# Patient Record
Sex: MALE | Race: BLACK OR AFRICAN AMERICAN | Employment: FULL TIME | URBAN - METROPOLITAN AREA
[De-identification: names, ages, dates, MRNs, and addresses within clinical notes are randomized per-mention and may not be internally consistent; named-entity substitution may affect disease eponyms.]

---

## 2022-05-25 ENCOUNTER — OFFICE VISIT (OUTPATIENT)
Dept: ORTHOPEDIC SURGERY | Age: 57
End: 2022-05-25
Payer: COMMERCIAL

## 2022-05-25 VITALS
OXYGEN SATURATION: 100 % | TEMPERATURE: 97.3 F | WEIGHT: 202.4 LBS | HEIGHT: 70 IN | HEART RATE: 70 BPM | BODY MASS INDEX: 28.98 KG/M2

## 2022-05-25 DIAGNOSIS — G89.29 CHRONIC RIGHT SHOULDER PAIN: Primary | ICD-10-CM

## 2022-05-25 DIAGNOSIS — M75.101 TEAR OF RIGHT ROTATOR CUFF, UNSPECIFIED TEAR EXTENT, UNSPECIFIED WHETHER TRAUMATIC: ICD-10-CM

## 2022-05-25 DIAGNOSIS — M25.511 CHRONIC RIGHT SHOULDER PAIN: Primary | ICD-10-CM

## 2022-05-25 PROCEDURE — 73030 X-RAY EXAM OF SHOULDER: CPT | Performed by: ORTHOPAEDIC SURGERY

## 2022-05-25 PROCEDURE — 99203 OFFICE O/P NEW LOW 30 MIN: CPT | Performed by: ORTHOPAEDIC SURGERY

## 2022-05-25 RX ORDER — MELOXICAM 15 MG/1
15 TABLET ORAL DAILY
Qty: 30 TABLET | Refills: 1 | Status: SHIPPED | OUTPATIENT
Start: 2022-05-25

## 2022-05-25 NOTE — PROGRESS NOTES
Scout Joy  1965   Chief Complaint   Patient presents with    Shoulder Pain     rt        HISTORY OF PRESENT ILLNESS  Scout Joy is a 64 y.o. male who presents today for evaluation of right shoulder. He rates his pain 0/10 today. Pain has been present for many years. He has pain anteriorly and with certain movements. Notes the pain has gradually gotten worse over the years. He has been exercising at home and noticed pain with abduction and external and internal rotation. He has night pain and can't lay on the right side. Has tried following treatments: Injections:NO; Brace:NO; Therapy:NO; Cane/Crutch:NO       No Known Allergies     Past Medical History:   Diagnosis Date    Elevated PSA     Nausea & vomiting       Social History     Socioeconomic History    Marital status:      Spouse name: Not on file    Number of children: Not on file    Years of education: Not on file    Highest education level: Not on file   Occupational History    Not on file   Tobacco Use    Smoking status: Never Smoker    Smokeless tobacco: Never Used   Substance and Sexual Activity    Alcohol use: No    Drug use: Never    Sexual activity: Not on file   Other Topics Concern    Not on file   Social History Narrative    Not on file     Social Determinants of Health     Financial Resource Strain:     Difficulty of Paying Living Expenses: Not on file   Food Insecurity:     Worried About Running Out of Food in the Last Year: Not on file    Kash of Food in the Last Year: Not on file   Transportation Needs:     Lack of Transportation (Medical): Not on file    Lack of Transportation (Non-Medical):  Not on file   Physical Activity:     Days of Exercise per Week: Not on file    Minutes of Exercise per Session: Not on file   Stress:     Feeling of Stress : Not on file   Social Connections:     Frequency of Communication with Friends and Family: Not on file    Frequency of Social Gatherings with Friends and Family: Not on file    Attends Jain Services: Not on file    Active Member of Clubs or Organizations: Not on file    Attends Club or Organization Meetings: Not on file    Marital Status: Not on file   Intimate Partner Violence:     Fear of Current or Ex-Partner: Not on file    Emotionally Abused: Not on file    Physically Abused: Not on file    Sexually Abused: Not on file   Housing Stability:     Unable to Pay for Housing in the Last Year: Not on file    Number of [place] in the Last Year: Not on file    Unstable Housing in the Last Year: Not on file      Past Surgical History:   Procedure Laterality Date    HX COLONOSCOPY      HX WISDOM TEETH EXTRACTION        History reviewed. No pertinent family history. Current Outpatient Medications   Medication Sig    triamcinolone acetonide (KENALOG) 0.1 % topical cream Apply  to affected area two (2) times a day.  tadalafiL (CIALIS) 20 mg tablet Take 1 Tablet by mouth as needed (prior to intercourse).  SELENIUM PO Take  by mouth daily.  naproxen sodium (ALEVE) 220 mg cap Take  by mouth daily as needed.  atorvastatin (LIPITOR) 10 mg tablet TAKE 1 TABLET BY MOUTH ONCE DAILY    multivitamin (ONE A DAY) tablet Take 1 Tab by mouth daily. No current facility-administered medications for this visit. REVIEW OF SYSTEM   Patient denies: Weight loss, Fever/Chills, HA, Visual changes, Fatigue, Chest pain, SOB, Abdominal pain, N/V/D/C, Blood in stool or urine, Edema. Pertinent positive as above in HPI. All others were negative    PHYSICAL EXAM:   Visit Vitals  Pulse 70   Temp 97.3 °F (36.3 °C) (Temporal)   Ht 5' 10\" (1.778 m)   Wt 202 lb 6.4 oz (91.8 kg)   SpO2 100%   BMI 29.04 kg/m²     The patient is a well-developed, well-nourished male   in no acute distress. The patient is alert and oriented times three. The patient is alert and oriented times three.  Mood and affect are normal.  LYMPHATIC: lymph nodes are not enlarged and are within normal limits  SKIN: normal in color and non tender to palpation. There are no bruises or abrasions noted. NEUROLOGICAL: Motor sensory exam is within normal limits. Reflexes are equal bilaterally. There is normal sensation to pinprick and light touch  MUSCULOSKELETAL:  Examination Right shoulder   Skin Intact   AC joint tenderness -   Biceps tenderness -   Forward flexion/Elevation    Active abduction    Glenohumeral abduction 90   External rotation ROM 45   Internal rotation ROM 45   Apprehension -   Forrests Relocation -   Jerk -   Load and Shift -   Obriens -   Speeds -   Impingement sign +   Supraspinatus/Empty Can +   External Rotation Strength -, 5/5   Lift Off/Belly Press -, 5/5   Neurovascular Intact        PROCEDURE: none    IMAGING: XR of the right shoulder with 3 views obtained in the office dated 5/25/2022 was reviewed and read by Dr. Milligan Pay: Sclerotic changes in the greater tuberosity with a type III acromion      IMPRESSION:      ICD-10-CM ICD-9-CM    1. Chronic right shoulder pain  M25.511 719.41 AMB POC XRAY, SHOULDER; COMPLETE, 2+    G89.29 338.29    2. Tear of right rotator cuff, unspecified tear extent, unspecified whether traumatic  M75.101 840.4         PLAN:  1. Patient presents today with right shoulder pain due to a possible RC tear and I would like to order a MRI to assess this. Start with mobic to help with inflammation. Offered cortisone injection today. Pt declined. Risk factors include: n/a  2. No ultrasound exam indicated today  3. No cortisone injection indicated today   4. No Physical/Occupational Therapy indicated today  5. Yes diagnostic test indicated today: R SHOULDER MRI   6. No durable medical equipment indicated today  7. No referral indicated today   8. Yes medications indicated today: MOBIC  9.  No Narcotic indicated today       RTC following MRI       Scribed by Torrey Patel as dictated by Amol Arora MD    I,  Nancy Hilliard, confirm that all documentation is accurate.     Nancy Hilliard M.D.   [facility]

## 2022-08-06 ENCOUNTER — HOSPITAL ENCOUNTER (OUTPATIENT)
Age: 57
Discharge: HOME | End: 2022-08-06
Attending: ORTHOPAEDIC SURGERY
Payer: COMMERCIAL

## 2022-08-06 DIAGNOSIS — M75.101 TEAR OF RIGHT ROTATOR CUFF, UNSPECIFIED TEAR EXTENT, UNSPECIFIED WHETHER TRAUMATIC: ICD-10-CM

## 2022-08-06 PROCEDURE — 73221 MRI JOINT UPR EXTREM W/O DYE: CPT

## 2022-08-18 ENCOUNTER — OFFICE VISIT (OUTPATIENT)
Dept: ORTHOPEDIC SURGERY | Age: 57
End: 2022-08-18
Payer: COMMERCIAL

## 2022-08-18 VITALS — HEIGHT: 70 IN | TEMPERATURE: 97.5 F | WEIGHT: 206 LBS | BODY MASS INDEX: 29.49 KG/M2

## 2022-08-18 DIAGNOSIS — M75.121 COMPLETE TEAR OF RIGHT ROTATOR CUFF, UNSPECIFIED WHETHER TRAUMATIC: Primary | ICD-10-CM

## 2022-08-18 PROCEDURE — 99213 OFFICE O/P EST LOW 20 MIN: CPT | Performed by: ORTHOPAEDIC SURGERY

## 2022-08-18 NOTE — PROGRESS NOTES
Charly Garsia  1965   Chief Complaint   Patient presents with    Results     MRI        HISTORY OF PRESENT ILLNESS  Charly Garsia is a 64 y.o. male who presents today for reevaluation of right shoulder and MRI review. Patient rates pain as 1/10 today. Pain has been present for many years. He has pain anteriorly and with certain movements. Notes the pain has gradually gotten worse over the years. He has been exercising at home and noticed pain with abduction and external and internal rotation. He has night pain and can't lay on the right side. Patient denies any fever, chills, chest pain, shortness of breath or calf pain. The remainder of the review of systems is negative. There are no new illness or injuries to report since last seen in the office. There are no changes to medications, allergies, family or social history. PHYSICAL EXAM:   Visit Vitals  Temp 97.5 °F (36.4 °C)   Ht 5' 10\" (1.778 m)   Wt 206 lb (93.4 kg)   BMI 29.56 kg/m²     The patient is a well-developed, well-nourished male   in no acute distress. The patient is alert and oriented times three. The patient is alert and oriented times three. Mood and affect are normal.  LYMPHATIC: lymph nodes are not enlarged and are within normal limits  SKIN: normal in color and non tender to palpation. There are no bruises or abrasions noted. NEUROLOGICAL: Motor sensory exam is within normal limits. Reflexes are equal bilaterally.  There is normal sensation to pinprick and light touch  MUSCULOSKELETAL:  Examination Right shoulder   Skin Intact   AC joint tenderness -   Biceps tenderness -   Forward flexion/Elevation    Active abduction    Glenohumeral abduction 90   External rotation ROM 45   Internal rotation ROM 45   Apprehension -   Forrests Relocation -   Jerk -   Load and Shift -   Obriens -   Speeds -   Impingement sign +   Supraspinatus/Empty Can +   External Rotation Strength -, 5/5   Lift Off/Belly Press -, 5/5 Neurovascular Intact       IMAGING: MRI of right shoulder dated 8/06/2022 was reviewed and read by Dr. Anamika Brown:   IMPRESSION:  1. Small full-thickness tear at anterior distal supraspinatus tendon, with  underlying tendinosis and articular sided partial-thickness tear extending  medially. Infraspinatus and subscapular tendinosis. 2. Small glenohumeral joint effusion. No labral tear. Mild tendinosis at the  biceps anchor. 3. Type II acromion with mild AC joint hypertrophy and inflammation. XR of the right shoulder with 3 views obtained in the office dated 5/25/2022 was reviewed and read by Dr. Anamika Brown: Sclerotic changes in the greater tuberosity with a type III acromion         IMPRESSION:      ICD-10-CM ICD-9-CM    1. Complete tear of right rotator cuff, unspecified whether traumatic  M75.121 727.61            PLAN:   1. Pt presents today with right shoulder pain due to an MRI-documented RCT. Surgery was discussed with the patient today but he would like to think about it. Can return as needed. Risk factors include: n/a  2. No ultrasound exam indicated today  3. No cortisone injection indicated today   4. No Physical/Occupational Therapy indicated today  5. No diagnostic test indicated today:   6. No durable medical equipment indicated today  7. No referral indicated today   8. No medications indicated today:   9. No Narcotic indicated today       RTC prn      Scribed by Lucy Rose) as dictated by MD WEN Schaffer, Dr. Ivanna Mckee, confirm that all documentation is accurate.     Ivanna Mckee M.D.   [facility]